# Patient Record
Sex: FEMALE | Race: WHITE
[De-identification: names, ages, dates, MRNs, and addresses within clinical notes are randomized per-mention and may not be internally consistent; named-entity substitution may affect disease eponyms.]

---

## 2017-09-19 ENCOUNTER — HOSPITAL ENCOUNTER (OUTPATIENT)
Dept: HOSPITAL 84 - D.MAMMO | Age: 77
Discharge: HOME | End: 2017-09-19
Attending: FAMILY MEDICINE
Payer: COMMERCIAL

## 2017-09-19 VITALS — BODY MASS INDEX: 29 KG/M2

## 2017-09-19 DIAGNOSIS — Z12.31: Primary | ICD-10-CM

## 2019-09-30 ENCOUNTER — HOSPITAL ENCOUNTER (OUTPATIENT)
Dept: HOSPITAL 84 - D.LABREF | Age: 79
Discharge: HOME | End: 2019-09-30
Attending: ORTHOPAEDIC SURGERY
Payer: COMMERCIAL

## 2019-09-30 VITALS — BODY MASS INDEX: 29 KG/M2

## 2019-09-30 DIAGNOSIS — M17.11: Primary | ICD-10-CM

## 2019-10-23 ENCOUNTER — HOSPITAL ENCOUNTER (OUTPATIENT)
Dept: HOSPITAL 84 - D.OPS | Age: 79
Discharge: HOME | End: 2019-10-23
Attending: ORTHOPAEDIC SURGERY
Payer: COMMERCIAL

## 2019-10-23 VITALS — HEIGHT: 65 IN | BODY MASS INDEX: 29.16 KG/M2 | WEIGHT: 175 LBS

## 2019-10-23 DIAGNOSIS — M16.11: Primary | ICD-10-CM

## 2019-10-23 DIAGNOSIS — Z53.9: ICD-10-CM

## 2019-10-23 LAB
ANION GAP SERPL CALC-SCNC: 12.5 MMOL/L (ref 8–16)
APPEARANCE UR: (no result)
APTT BLD: 30.7 SECONDS (ref 22.8–39.4)
BACTERIA #/AREA URNS HPF: (no result) /HPF
BASOPHILS NFR BLD AUTO: 0.1 % (ref 0–2)
BILIRUB SERPL-MCNC: NEGATIVE MG/DL
BUN SERPL-MCNC: 22 MG/DL (ref 7–18)
CALCIUM SERPL-MCNC: 9.5 MG/DL (ref 8.5–10.1)
CHLORIDE SERPL-SCNC: 105 MMOL/L (ref 98–107)
CO2 SERPL-SCNC: 27.9 MMOL/L (ref 21–32)
COLOR UR: YELLOW
CREAT SERPL-MCNC: 0.9 MG/DL (ref 0.6–1.3)
EOSINOPHIL NFR BLD: 1.1 % (ref 0–7)
ERYTHROCYTE [DISTWIDTH] IN BLOOD BY AUTOMATED COUNT: 13.6 % (ref 11.5–14.5)
GLUCOSE SERPL-MCNC: 74 MG/DL (ref 74–106)
GLUCOSE SERPL-MCNC: NEGATIVE MG/DL
HCT VFR BLD CALC: 47.5 % (ref 36–48)
HGB BLD-MCNC: 16 G/DL (ref 12–16)
HYALINE CASTS #/AREA URNS LPF: (no result) /LPF
IMM GRANULOCYTES NFR BLD: 0.4 % (ref 0–5)
INR PPP: 0.93 (ref 0.85–1.17)
KETONES UR STRIP-MCNC: NEGATIVE MG/DL
LYMPHOCYTES NFR BLD AUTO: 24.8 % (ref 15–50)
MCH RBC QN AUTO: 31.3 PG (ref 26–34)
MCHC RBC AUTO-ENTMCNC: 33.7 G/DL (ref 31–37)
MCV RBC: 92.8 FL (ref 80–100)
MONOCYTES NFR BLD: 7 % (ref 2–11)
MUCOUS THREADS #/AREA URNS LPF: (no result) /LPF
NEUTROPHILS NFR BLD AUTO: 66.6 % (ref 40–80)
NITRITE UR-MCNC: NEGATIVE MG/ML
OSMOLALITY SERPL CALC.SUM OF ELEC: 282 MOSM/KG (ref 275–300)
PH UR STRIP: 5 [PH] (ref 5–6)
PLATELET # BLD: 252 10X3/UL (ref 130–400)
PMV BLD AUTO: 9.9 FL (ref 7.4–10.4)
POTASSIUM SERPL-SCNC: 4.4 MMOL/L (ref 3.5–5.1)
PROT UR-MCNC: (no result) MG/DL
PROTHROMBIN TIME: 12 SECONDS (ref 11.6–15)
RBC # BLD AUTO: 5.12 10X6/UL (ref 4–5.4)
RBC #/AREA URNS HPF: (no result) /HPF (ref 0–5)
SODIUM SERPL-SCNC: 141 MMOL/L (ref 136–145)
SP GR UR STRIP: 1.01 (ref 1–1.02)
SQUAMOUS #/AREA URNS HPF: (no result) /HPF (ref 0–5)
UROBILINOGEN UR-MCNC: NORMAL MG/DL
WBC # BLD AUTO: 10.9 10X3/UL (ref 4.8–10.8)

## 2019-11-04 ENCOUNTER — HOSPITAL ENCOUNTER (INPATIENT)
Dept: HOSPITAL 84 - D.MS | Age: 79
LOS: 4 days | Discharge: HOME HEALTH | DRG: 470 | End: 2019-11-08
Attending: ORTHOPAEDIC SURGERY | Admitting: ORTHOPAEDIC SURGERY
Payer: COMMERCIAL

## 2019-11-04 VITALS — BODY MASS INDEX: 29.16 KG/M2 | WEIGHT: 175 LBS | HEIGHT: 65 IN | BODY MASS INDEX: 29.16 KG/M2

## 2019-11-04 DIAGNOSIS — K21.9: ICD-10-CM

## 2019-11-04 DIAGNOSIS — J44.9: ICD-10-CM

## 2019-11-04 DIAGNOSIS — G47.00: ICD-10-CM

## 2019-11-04 DIAGNOSIS — I10: ICD-10-CM

## 2019-11-04 DIAGNOSIS — M16.11: Primary | ICD-10-CM

## 2019-11-04 DIAGNOSIS — E03.9: ICD-10-CM

## 2019-11-06 VITALS — SYSTOLIC BLOOD PRESSURE: 166 MMHG | DIASTOLIC BLOOD PRESSURE: 88 MMHG

## 2019-11-06 VITALS — DIASTOLIC BLOOD PRESSURE: 70 MMHG | SYSTOLIC BLOOD PRESSURE: 140 MMHG

## 2019-11-06 VITALS — DIASTOLIC BLOOD PRESSURE: 72 MMHG | SYSTOLIC BLOOD PRESSURE: 138 MMHG

## 2019-11-06 VITALS — DIASTOLIC BLOOD PRESSURE: 53 MMHG | SYSTOLIC BLOOD PRESSURE: 117 MMHG

## 2019-11-06 LAB
ANION GAP SERPL CALC-SCNC: 14.8 MMOL/L (ref 8–16)
APPEARANCE UR: (no result)
APTT BLD: 30.7 SECONDS (ref 22.8–39.4)
BACTERIA #/AREA URNS HPF: (no result) /HPF
BASOPHILS NFR BLD AUTO: 0.1 % (ref 0–2)
BILIRUB SERPL-MCNC: NEGATIVE MG/DL
BUN SERPL-MCNC: 19 MG/DL (ref 7–18)
CALCIUM SERPL-MCNC: 9.3 MG/DL (ref 8.5–10.1)
CHLORIDE SERPL-SCNC: 105 MMOL/L (ref 98–107)
CO2 SERPL-SCNC: 26.3 MMOL/L (ref 21–32)
COLOR UR: YELLOW
CREAT SERPL-MCNC: 0.9 MG/DL (ref 0.6–1.3)
EOSINOPHIL NFR BLD: 1.2 % (ref 0–7)
ERYTHROCYTE [DISTWIDTH] IN BLOOD BY AUTOMATED COUNT: 13.6 % (ref 11.5–14.5)
GLUCOSE SERPL-MCNC: 97 MG/DL (ref 74–106)
GLUCOSE SERPL-MCNC: NEGATIVE MG/DL
HCT VFR BLD CALC: 49 % (ref 36–48)
HGB BLD-MCNC: 16.1 G/DL (ref 12–16)
IMM GRANULOCYTES NFR BLD: 0.3 % (ref 0–5)
INR PPP: 0.95 (ref 0.85–1.17)
KETONES UR STRIP-MCNC: NEGATIVE MG/DL
LYMPHOCYTES NFR BLD AUTO: 20.6 % (ref 15–50)
MCH RBC QN AUTO: 30.7 PG (ref 26–34)
MCHC RBC AUTO-ENTMCNC: 32.9 G/DL (ref 31–37)
MCV RBC: 93.5 FL (ref 80–100)
MONOCYTES NFR BLD: 7.1 % (ref 2–11)
MUCOUS THREADS #/AREA URNS LPF: (no result) /LPF
NEUTROPHILS NFR BLD AUTO: 70.7 % (ref 40–80)
NITRITE UR-MCNC: NEGATIVE MG/ML
OSMOLALITY SERPL CALC.SUM OF ELEC: 284 MOSM/KG (ref 275–300)
PH UR STRIP: 5 [PH] (ref 5–6)
PLATELET # BLD: 239 10X3/UL (ref 130–400)
PMV BLD AUTO: 10 FL (ref 7.4–10.4)
POTASSIUM SERPL-SCNC: 4.1 MMOL/L (ref 3.5–5.1)
PROT UR-MCNC: NEGATIVE MG/DL
PROTHROMBIN TIME: 12.2 SECONDS (ref 11.6–15)
RBC # BLD AUTO: 5.24 10X6/UL (ref 4–5.4)
RBC #/AREA URNS HPF: (no result) /HPF (ref 0–5)
SODIUM SERPL-SCNC: 142 MMOL/L (ref 136–145)
SP GR UR STRIP: 1.01 (ref 1–1.02)
SQUAMOUS #/AREA URNS HPF: (no result) /HPF (ref 0–5)
UROBILINOGEN UR-MCNC: NORMAL MG/DL
WBC # BLD AUTO: 9.8 10X3/UL (ref 4.8–10.8)
WBC #/AREA URNS HPF: (no result) /HPF

## 2019-11-06 PROCEDURE — 0SR90J9 REPLACEMENT OF RIGHT HIP JOINT WITH SYNTHETIC SUBSTITUTE, CEMENTED, OPEN APPROACH: ICD-10-PCS | Performed by: ORTHOPAEDIC SURGERY

## 2019-11-06 NOTE — NUR
RECEIVED PT TO ROOM, PT IS AWAKE AND ALERT STATES PAIN IS AT A 5, AFTER
SETTING PT UP ADMINISTERED PRN PAIN MEDICATION. FAMILY AT BEDSIDE, PLUGGED IN
WOUND VAC CONTINUE WITH PLAN OF CARE

## 2019-11-06 NOTE — NUR
PLASMA BLADE SET TO 6/8
BOVIE PAD LEFT THIGH [id number] EXP 09/15/21
 
PREPPED ILIAC CREST TO HANA BOOT WITH ALCHOLO/HIBICLENS THEN DRIED
WITH A TOWEL AND PREPPED WITH CHLORAPREP.  STERILE GOWN AND GLOVES
WORN DURING PREP.
 
TRAFFIC MONITORED IN AND OUT OF ROOM AND KEPT TO A MINIMUM.
 
WOUND IRRIGATED WITH 500 ML NS WITH 10% STERILE BETADINE.

## 2019-11-06 NOTE — NUR
Alert and orented  able to voice needs and wants to staff. sleepy, family at
bedside. FAll precations in place. SCD's TEDS in place. Prevena Pluse placed
to Right hip with dressing CDI. Incentive spirometer at bedside and education
given,as well as to trun cough and deep breath . IV to left hand  with 1/2ns
at 50ml/hr. call light in reach.

## 2019-11-07 VITALS — DIASTOLIC BLOOD PRESSURE: 42 MMHG | SYSTOLIC BLOOD PRESSURE: 103 MMHG

## 2019-11-07 VITALS — DIASTOLIC BLOOD PRESSURE: 60 MMHG | SYSTOLIC BLOOD PRESSURE: 128 MMHG

## 2019-11-07 VITALS — DIASTOLIC BLOOD PRESSURE: 47 MMHG | SYSTOLIC BLOOD PRESSURE: 119 MMHG

## 2019-11-07 VITALS — DIASTOLIC BLOOD PRESSURE: 57 MMHG | SYSTOLIC BLOOD PRESSURE: 125 MMHG

## 2019-11-07 VITALS — DIASTOLIC BLOOD PRESSURE: 60 MMHG | SYSTOLIC BLOOD PRESSURE: 110 MMHG

## 2019-11-07 VITALS — DIASTOLIC BLOOD PRESSURE: 51 MMHG | SYSTOLIC BLOOD PRESSURE: 124 MMHG

## 2019-11-07 VITALS — DIASTOLIC BLOOD PRESSURE: 57 MMHG | SYSTOLIC BLOOD PRESSURE: 118 MMHG

## 2019-11-07 LAB
ANION GAP SERPL CALC-SCNC: 11.6 MMOL/L (ref 8–16)
APPEARANCE UR: CLEAR
BASOPHILS NFR BLD AUTO: 0.1 % (ref 0–2)
BILIRUB SERPL-MCNC: NEGATIVE MG/DL
BUN SERPL-MCNC: 18 MG/DL (ref 7–18)
CALCIUM SERPL-MCNC: 8.1 MG/DL (ref 8.5–10.1)
CHLORIDE SERPL-SCNC: 102 MMOL/L (ref 98–107)
CO2 SERPL-SCNC: 28.6 MMOL/L (ref 21–32)
COLOR UR: YELLOW
CREAT SERPL-MCNC: 1 MG/DL (ref 0.6–1.3)
EOSINOPHIL NFR BLD: 0 % (ref 0–7)
ERYTHROCYTE [DISTWIDTH] IN BLOOD BY AUTOMATED COUNT: 13.5 % (ref 11.5–14.5)
GLUCOSE SERPL-MCNC: 110 MG/DL (ref 74–106)
GLUCOSE SERPL-MCNC: 50 MG/DL
HCT VFR BLD CALC: 39.3 % (ref 36–48)
HGB BLD-MCNC: 12.8 G/DL (ref 12–16)
IMM GRANULOCYTES NFR BLD: 0.2 % (ref 0–5)
KETONES UR STRIP-MCNC: NEGATIVE MG/DL
LYMPHOCYTES NFR BLD AUTO: 9.4 % (ref 15–50)
MAGNESIUM SERPL-MCNC: 1.6 MG/DL (ref 1.8–2.4)
MCH RBC QN AUTO: 30.2 PG (ref 26–34)
MCHC RBC AUTO-ENTMCNC: 32.6 G/DL (ref 31–37)
MCV RBC: 92.7 FL (ref 80–100)
MONOCYTES NFR BLD: 8.9 % (ref 2–11)
NEUTROPHILS NFR BLD AUTO: 81.4 % (ref 40–80)
NITRITE UR-MCNC: NEGATIVE MG/ML
OSMOLALITY SERPL CALC.SUM OF ELEC: 278 MOSM/KG (ref 275–300)
PH UR STRIP: 5 [PH] (ref 5–6)
PHOSPHATE SERPL-MCNC: 3 MG/DL (ref 2.5–4.9)
PLATELET # BLD: 226 10X3/UL (ref 130–400)
PMV BLD AUTO: 9.8 FL (ref 7.4–10.4)
POTASSIUM SERPL-SCNC: 4.2 MMOL/L (ref 3.5–5.1)
PROT UR-MCNC: NEGATIVE MG/DL
RBC # BLD AUTO: 4.24 10X6/UL (ref 4–5.4)
SODIUM SERPL-SCNC: 138 MMOL/L (ref 136–145)
SP GR UR STRIP: 1.01 (ref 1–1.02)
UROBILINOGEN UR-MCNC: NORMAL MG/DL
WBC # BLD AUTO: 16.5 10X3/UL (ref 4.8–10.8)

## 2019-11-07 NOTE — OP
PATIENT NAME:  LURDES GLASGOW                     MEDICAL RECORD: [id number]
:[birth year]                                             LOCATION:     D.2226
                                                         ADMISSION DATE:19
SURGEON:  MUMTAZ WRAY DO         
 
 
DATE OF OPERATION:  2019
 
PROCEDURE PERFORMED:  Right total hip arthroplasty.
 
PREOPERATIVE DIAGNOSIS:  Right hip osteoarthritis.
 
POSTOPERATIVE DIAGNOSIS:  Right hip osteoarthritis.
 
INDICATIONS:  Ms. Glasgow is a 79-year-old female who has tried all manners of
nonoperative treatment for her right hip osteoarthritis.  She got to the point
where she was tired of dealing with the pain and wanted something done
surgically as it was affecting her activities of living.  She is aware of the
risks of infection, bleeding, damage to nerves and vessels, need for further
surgery, fracture, leg-length discrepancy, blood clots, and even death and she
signed the consent.
 
SURGEON:  Mumtaz Wray DO
 
ASSISTANT:  Paul Clements, certified surgical first assist.
 
DESCRIPTION OF THE PROCEDURE:  The patient received a block in the preoperative
area by anesthesia and he was given 2 grams of Ancef and 80 mg of gentamicin. 
She was then taken to the operative suite, laid in supine position, sedated and
intubated.  The patient was then moved over to [place] table and positioned.
 
The right hip was prepped and draped in sterile fashion.  Timeout was performed,
everyone was in agreement of the correct side, site, patient and procedure.  The
patient received 1 gram of TXA and then the hip was then re-prepped and then the
Ioban cover was put over and then made incision over the tensor fascia suzanna. 
Careful dissection was made down to the fascia suzanna fascia, this was incised and
the muscle belly taken posterior to the fascia anteriorly, opening up the rectus
interval.  Rectus was then opened and the rectus was taken medially and the
fascia suzanna laterally.  The ascending branch of the lateral femoral circumflex
arteries were encountered, tied off, and coagulated with Aquamantys and cut. 
Once that was done, the capsule was exposed with Hohmann's on either side of it
and the capsule was then opened and tagged.  The Hohmann's was then placed on
the inner aspect of the capsule.  Neck cut was then made and then re-cut.  Once
that was done, the acetabulum was then exposed and a Charnley was placed.  The
labrum was removed as well as the Pulvinar.  We began reaming with a 44, first
medializing and then up to a 50, #50 cup was impacted into place, it fit very
well and the poly was placed in and due to her NICKEL ALLERGY we did not do a
dual mobility.  The femur was then exposed.  Cookie cutter and canal finder were
used in the canal and then we broached up to an #8, #8 was trialed, seemed to be
in good position and good length on AP pelvis, no fracture seen.  I then took
the #8 out and it was a little loose.  We did a #9 and #9 was then inserted into
the patient's femur and with a -3 neck ceramic.  The hip was then reduced and
then confirmed to be in good position and good length on AP pelvis.
 
The hip was then irrigated with Betadine solution 500 mL and 17 mL of sterile
10% povidone-iodine and left in the wound for 3 minutes.  This was then
irrigated out with over a liter of normal saline and then the capsule was closed
and then vancomycin and tobramycin powder as well as Francois powder was placed in
 
 
 
OPERATIVE REPORT                               [id number]    LURDES GLASGOW   
 
 
the wound.  The fascia was then closed with #1-Vicryl, first a figure-of-eight
and then a running locking stitch.  Skin was then closed with 2-0 Vicryl in an
inverted interrupted fashion, 4-0 Monocryl ran on the skin.  A Prevena Plus had
been placed on the skin.
 
She was awakened and taken to the recovery in stable condition.
 
Blood loss was approximately 200 mL.
 
Complication none.
 
TRANSINT:[id number] Voice Confirmation ID: [id number] DOCUMENT ID: [id number]
                                           
                                           MUMTAZ WRAY DO         
 
 
 
Electronically Signed by MUMTAZ BUCIO on 19 at 0746
 
 
 
 
 
 
 
 
 
 
 
 
 
 
 
 
 
 
 
 
 
 
 
 
 
 
 
 
 
CC: JESSIKA NASSAR                                                [id number]
DICTATION DATE: 19     :     19      ADM IN  
                                                                              
[facility]                                          
[address] [id number]

## 2019-11-07 NOTE — MORECARE
CASE MANAGEMENT DISCHARGE SUMMARY
 
 
PATIENT: LURDES GLASGOW               UNIT: [id number]
ACCOUNT#: [id number]                       ADM DATE: 19
AGE: 79     : 44  SEX: F            ROOM/BED: D.2226    
AUTHOR: WALE,DOC                             PHYSICIAN:                               
 
REFERRING PHYSICIAN: VANGIE WRAY DO         
DATE OF SERVICE: 19
Discharge Plan
 
 
Patient Name: LURDES GLASGOW
Facility: [facility]:[place]
Encounter #: [id number]
Medical Record #: [id number]
: 1940
Planned Disposition: Home
Anticipated Discharge Date: 19
 
Discharge Date: 
Expected LOS: 2
Initial Reviewer: [id number]
Initial Review Date: 2019
Generated: 19   3:45 pm 
Comments
 
DCP- Discharge Planning
 
Updated by [id number]: Quita Laguerre on 19   1:44 pm CT
Patient Name: LURDES GLASGOW                                     
Admission Status: Elective   
Accout number: [id number]                              
Admission Date: 2019   
: 1940                                                        
Admission Diagnosis:   
Attending: VANGIE WRAY                                                
Current LOS:  1   
  
Anticipated DC Date: 2019   
Planned Disposition: Home   
Primary Insurance: [employer]   
  
  
Discharge Planning Comments: CM met with patient to discuss discharge 
planning/needs. States she lives with her  in a single story home, no 
steps to enter the home. Her daughter, Dori Matias, will be staying with her 
for 2 weeks. She is planning on outpatient PT at [facility], however states Dr. Wray says she may need home health for the first week or two. She requests 
 
that I return to speak with her about it tomorrow since she is having nausea 
today. CM will meet with her again in the morning. CM will continue to follow 
and assist with discharge planning/needs.  
  
  
  
  
  
  
: Quita Laguerre
 DCPIA - Discharge Planning Initial Assessment
 
Updated by [id number]: Quita Laguerre on 19   2:41 pm
*  Is the patient Alert and Oriented?
Yes
*  How many steps to enter\exit or inside your home? 0/0 *  PCP Dr. Velez *  Pharmacy [facility]
on [address]
*  Preadmission Environment
Home with Family
*  ADLs
Independent
*  Equipment
Bedside Commode
Cane
Other
Walker
*  Other Equipment
Brace  Ice pack
 
*  List name and contact numbers for known caregivers / representatives who 
currently or will assist patient after discharge:
Jovani Glasgow - spouse - [phone]
*  Verbal permission to speak to the caregivers and representatives has been 
obtained from the patient.
Yes
*  Community resources currently utilized
None
*  Additional services required to return to the preadmission environment?
Yes
*  Can the patient safely return to the preadmission environment?
Yes
*  Has this patient been hospitalized within the prior 30 days at any 
hospital?
No
 
 
 
 
 
 
Patient Name: LURDES GLASGOW
 
Encounter #: [id number]
Page [id number]
 
 
 
 
 
Electronically Signed by TED ECHEVARRIA on 19 at 1445
 
 
 
 
 
 
**All edits/amendments must be made on the electronic document**
 
DICTATION DATE: 19     : MERYL  19     
RPT#: [id number]                                DC DATE:        
                                               STATUS: ADM IN  
[facility]
 [place] [address]
***END OF REPORT***

## 2019-11-07 NOTE — NUR
PATIENT IS ALERT AND ORENTED ABLE TO VOICE NEEDS AND WANTS TO STAFF.  FAMILY
AT BEDSIDE. IV TO LEFT HAND WITH 1/2NS AT 50 AND ZOFRAN AT 4.7 ML/HR, NO
REDNESS AT SITE.  C/O OF NAUSEA WITH DRINKING. TEMP .1 REPORTED BY CNA
RECHECK WAS 99.2, CALL TO [facility]  NEW ORDER FOR TYLENOL 500MG PO PRN EVERY 6 HR.
FOR TEMP .5 AND STATED HE WOULD REVIEW CHART. PATIENT AND FAMILY
INFORMED. WOUND VACK IN PLACE TO RIGHT HIP WITH DRESSING CDI. FALL PERCATIONS
IN PLACE, INCENTIVE SPIROMETER N REACH, CALL LIGHT  AND WATER  AND ICE  IN
REACH.

## 2019-11-07 NOTE — NUR
PATIENT IN BED WITH IV INTACT. ZOFRAN DRIP INFUSING. PATIENT STILL NOT EATING
OR DRINKING AT THIS TIME. NAUSEATED AT TIMES. FAMILY AT BEDSIDE. PROVENA WOUND
VAC ON AND WORKING. CALL LIGHT WITHIN REACH.

## 2019-11-08 VITALS — DIASTOLIC BLOOD PRESSURE: 43 MMHG | SYSTOLIC BLOOD PRESSURE: 112 MMHG

## 2019-11-08 VITALS — DIASTOLIC BLOOD PRESSURE: 74 MMHG | SYSTOLIC BLOOD PRESSURE: 164 MMHG

## 2019-11-08 VITALS — DIASTOLIC BLOOD PRESSURE: 42 MMHG | SYSTOLIC BLOOD PRESSURE: 121 MMHG

## 2019-11-08 VITALS — SYSTOLIC BLOOD PRESSURE: 121 MMHG | DIASTOLIC BLOOD PRESSURE: 56 MMHG

## 2019-11-08 LAB
ANION GAP SERPL CALC-SCNC: 7.1 MMOL/L (ref 8–16)
BASOPHILS NFR BLD AUTO: 0.1 % (ref 0–2)
BUN SERPL-MCNC: 14 MG/DL (ref 7–18)
CALCIUM SERPL-MCNC: 8.2 MG/DL (ref 8.5–10.1)
CHLORIDE SERPL-SCNC: 102 MMOL/L (ref 98–107)
CO2 SERPL-SCNC: 29.8 MMOL/L (ref 21–32)
CREAT SERPL-MCNC: 0.7 MG/DL (ref 0.6–1.3)
EOSINOPHIL NFR BLD: 0.3 % (ref 0–7)
ERYTHROCYTE [DISTWIDTH] IN BLOOD BY AUTOMATED COUNT: 13.7 % (ref 11.5–14.5)
GLUCOSE SERPL-MCNC: 96 MG/DL (ref 74–106)
HCT VFR BLD CALC: 40.4 % (ref 36–48)
HGB BLD-MCNC: 13.1 G/DL (ref 12–16)
IMM GRANULOCYTES NFR BLD: 0.3 % (ref 0–5)
LYMPHOCYTES NFR BLD AUTO: 11.3 % (ref 15–50)
MAGNESIUM SERPL-MCNC: 1.9 MG/DL (ref 1.8–2.4)
MCH RBC QN AUTO: 30.5 PG (ref 26–34)
MCHC RBC AUTO-ENTMCNC: 32.4 G/DL (ref 31–37)
MCV RBC: 94 FL (ref 80–100)
MONOCYTES NFR BLD: 10.7 % (ref 2–11)
NEUTROPHILS NFR BLD AUTO: 77.3 % (ref 40–80)
OSMOLALITY SERPL CALC.SUM OF ELEC: 270 MOSM/KG (ref 275–300)
PHOSPHATE SERPL-MCNC: 2 MG/DL (ref 2.5–4.9)
PLATELET # BLD: 217 10X3/UL (ref 130–400)
PMV BLD AUTO: 10.5 FL (ref 7.4–10.4)
POTASSIUM SERPL-SCNC: 3.9 MMOL/L (ref 3.5–5.1)
RBC # BLD AUTO: 4.3 10X6/UL (ref 4–5.4)
SODIUM SERPL-SCNC: 135 MMOL/L (ref 136–145)
WBC # BLD AUTO: 11.4 10X3/UL (ref 4.8–10.8)

## 2019-11-08 NOTE — NUR
PATIENT DISCHARGED TO HOME AMBULATORY WITH FAMILY. DISCHARGE INSTRUCTIONS
GIVEN BOTH VERBALLY AND WRITTEN. ALL QUESTIONS ANSWERED. PATIENT AND FAMILY
VERBALIZED UNDERSTANDING OF SAME. PREVENA CHANGED TO HOME VACCUM WITHOUT
DIFFICULTY. IV TO LEFT HAND D/C WITH CATHETER INTACT. ALL BELONGINGS WITH
PATIENT. NEEDED PRESCRIPTIONS GIVEN TO PATIENT.

## 2019-11-08 NOTE — MORECARE
CASE MANAGEMENT DISCHARGE SUMMARY
 
 
PATIENT: LURDES GLASGOW               UNIT: [id number]
ACCOUNT#: [id number]                       ADM DATE: 19
AGE: 79     : 44  SEX: F            ROOM/BED: D.2226    
AUTHOR: TED ECHEVARRIA                             PHYSICIAN:                               
 
REFERRING PHYSICIAN: VANGIE WRAY DO         
DATE OF SERVICE: 19
Discharge Plan
 
 
Patient Name: LURDES GLASGOW
Facility: [facility]:[place]
Encounter #: [id number]
Medical Record #: [id number]
: 1940
Planned Disposition: Home
Anticipated Discharge Date: 19
 
Discharge Date: 2019
Expected LOS: 2
Initial Reviewer: [email or web address]
Initial Review Date: 2019
Generated: 19   6:36 pm 
Comments
 
DCP- Discharge Planning
 
Updated by [id number]: Quita Laguerre on 19   2:23 pm CT
Received discharge orders. I met with her and her family concerning 
outpatient PT vs Home health for PT. Patient and family states that she would 
like to have home health for the first week, then go to outpatient PT at [facility].
 I called [facility] and they do not have availability until Wednesday next 
week (per Annmarie). I called [facility] and spoke with Arlette, they will start 
PT on Monday, clinical faxed along with OP PT order so they can refer her 
when ready. Discharging today with [facility].
DCP- Discharge Planning
 
Updated by [id number]: Quita Laguerre on 19   1:44 pm CT
Patient Name: LURDES GLASGOW                                     
Admission Status: Elective   
Accout number: [id number]                              
Admission Date: 2019   
: 1940                                                        
Admission Diagnosis:   
Attending: VANGIE WRAY                                                
Current LOS:  1   
  
 
Anticipated DC Date: 2019   
Planned Disposition: Home   
Primary Insurance: [employer]   
  
  
Discharge Planning Comments: CM met with patient to discuss discharge 
planning/needs. States she lives with her  in a single story home, no 
steps to enter the home. Her daughter, Dori Matias, will be staying with her 
for 2 weeks. She is planning on outpatient PT at [facility], however states Dr. Wray says she may need home health for the first week or two. She requests 
that I return to speak with her about it tomorrow since she is having nausea 
today. CM will meet with her again in the morning. CM will continue to follow 
and assist with discharge planning/needs.  
  
  
  
  
  
  
: Quita Laguerre
 DCPIA - Discharge Planning Initial Assessment
 
Updated by [id number]: Quita Gallegoelidia on 19   2:41 pm
*  Is the patient Alert and Oriented?
Yes
*  How many steps to enter\exit or inside your home? 0/0 *  PCP Dr. Velez *  Pharmacy [facility]
on [address]
*  Preadmission Environment
Home with Family
*  ADLs
Independent
*  Equipment
Bedside Commode
Cane
Other
Walker
*  Other Equipment
Brace  Ice pack
 
*  List name and contact numbers for known caregivers / representatives who 
currently or will assist patient after discharge:
Jovani Glasgow - [facility] - [phone]
*  Verbal permission to speak to the caregivers and representatives has been 
obtained from the patient.
Yes
*  Community resources currently utilized
None
*  Additional services required to return to the preadmission environment?
Yes
*  Can the patient safely return to the preadmission environment?
Yes
*  Has this patient been hospitalized within the prior 30 days at any 
 
hospital?
No
 
 
 
 
 
Coverage Notice
 
Reviewer: [email or web address] - Quita Gallegoelidia
 
Notice Issued Date-Time: 2019  15:03
Notice Type: Patient Choice Letter
 
Notice Delivered To: Patient
Relationship to Patient: Self
Representative Name: 
 
Delivery Method: HAND - Hand Delivered
Evangelina Days:
Prior Verbal Notification: 
 
Recipient Understood Notice: Yes
Recipient Signature: Yes
Med Rec Note Co-signed by Attending:
 
Coverage Notice Comment:  [facility]
 
Last DP export: 19   2:27 p
Patient Name: LURDES GLASGOW
Encounter #: [id number]
Page [id number]
 
 
 
 
 
Electronically Signed by TED [facility] on 19 at 1736
 
 
 
 
 
 
**All edits/amendments must be made on the electronic document**
 
DICTATION DATE: 19     : MERYL  19     
RPT#: [id number]                                IN DATE:19
                                               STATUS: DIS IN  
[facility]
[address] [place] [address]
***END OF REPORT***

## 2019-11-08 NOTE — MORECARE
CASE MANAGEMENT DISCHARGE SUMMARY
 
 
PATIENT: LURDES GLASGOW               UNIT: [id number]
ACCOUNT#: [id number]                       ADM DATE: 19
AGE: 79     : 44  SEX: F            ROOM/BED: D.2226    
AUTHOR: TED ECHEVARRIA                             PHYSICIAN:                               
 
REFERRING PHYSICIAN: VANGIE WRAY DO         
DATE OF SERVICE: 19
Discharge Plan
 
 
Patient Name: LURDES GLASGOW
Facility: [facility]:[place]
Encounter #: [id number]
Medical Record #: [id number]
: 1940
Planned Disposition: Home
Anticipated Discharge Date: 19
 
Discharge Date: 
Expected LOS: 2
Initial Reviewer: [email or web address]
Initial Review Date: 2019
Generated: 19   4:27 pm 
Comments
 
DCP- Discharge Planning
 
Updated by [id number]: Quita Laguerre on 19   2:23 pm CT
Received discharge orders. I met with her and her family concerning 
outpatient PT vs Home health for PT. Patient and family states that she would 
like to have home health for the first week, then go to outpatient PT at [facility].
 I called [facility] and they do not have availability until Wednesday next 
week (per Annmarie). I called [facility] and spoke with Arlette, they will start 
PT on Monday, clinical faxed along with OP PT order so they can refer her 
when ready. Discharging today with [facility].
DCP- Discharge Planning
 
Updated by [email or web address]: Quita Laguerre on 19   1:44 pm CT
Patient Name: LURDES GLASGOW                                     
Admission Status: Elective   
Accout number: [id number]                              
Admission Date: 2019   
: 1940                                                        
Admission Diagnosis:   
Attending: VANGIE WRAY                                                
Current LOS:  1   
  
 
Anticipated DC Date: 2019   
Planned Disposition: Home   
Primary Insurance: [employer]   
  
  
Discharge Planning Comments: CM met with patient to discuss discharge 
planning/needs. States she lives with her  in a single story home, no 
steps to enter the home. Her daughter, Dori Matias, will be staying with her 
for 2 weeks. She is planning on outpatient PT at [facility], however states Dr. Wray says she may need home health for the first week or two. She requests 
that I return to speak with her about it tomorrow since she is having nausea 
today. CM will meet with her again in the morning. CM will continue to follow 
and assist with discharge planning/needs.  
  
  
  
  
  
  
: Quita Laguerre
 DCPIA - Discharge Planning Initial Assessment
 
Updated by [email or web address]: Quita Laguerre on 19   2:41 pm
*  Is the patient Alert and Oriented?
Yes
*  How many steps to enter\exit or inside your home? 0/0 *  PCP Dr. Velez *  Pharmacy [facility]
on [address]
*  Preadmission Environment
Home with Family
*  ADLs
Independent
*  Equipment
Bedside Commode
Cane
Other
Walker
*  Other Equipment
Brace  Ice pack
 
*  List name and contact numbers for known caregivers / representatives who 
currently or will assist patient after discharge:
Jovani Glasgow - spouse - [phone]
*  Verbal permission to speak to the caregivers and representatives has been 
obtained from the patient.
Yes
*  Community resources currently utilized
None
*  Additional services required to return to the preadmission environment?
Yes
*  Can the patient safely return to the preadmission environment?
Yes
*  Has this patient been hospitalized within the prior 30 days at any 
 
hospital?
No
 
External Providers
External Provider: [facility]
 
Next Contact Date: 
Service Request Date: 
Service Type: 
Resolution: 
 
Reviewer: 
Comments: 
 
 
 
 
Coverage Notice
 
Reviewer: [email or web address] - Quita Laguerre
 
Notice Issued Date-Time: 2019  15:03
Notice Type: Patient Choice Letter
 
Notice Delivered To: Patient
Relationship to Patient: Self
Representative Name: 
 
Delivery Method: HAND - Hand Delivered
Evangelina Days:
Prior Verbal Notification: 
 
Recipient Understood Notice: Yes
Recipient Signature: Yes
Med Rec Note Co-signed by Attending:
 
Coverage Notice Comment:  [facility]
 
Last DP export: 19   2:00 p
Patient Name: LURDES GLASGOW
 
Encounter #: [id number]
Page [id number]
 
 
 
 
 
Electronically Signed by TED ECHEVARRIA on 19 at 1527
 
 
 
 
 
 
**All edits/amendments must be made on the electronic document**
 
DICTATION DATE: 19     : MERYL  19     
RPT#: [id number]                                DC DATE:        
                                               STATUS: ADM IN  
[facility]
[address] [place] [address]
***END OF REPORT***

## 2019-11-08 NOTE — NUR
AWAKE AND ALERT. ORIENTED X3. NO C/O AT THIS TIME. LUNGS ARE CLEAR BILATERALLY
OCCASSIONALLY PRODUCTIVE COUGH NOTED. SKIN IS INTACT WITHOUT REDNESS EXCEPT
INCISION TO RIGHT HIP WHICH IS CLEAN AND DRY WITH PROVENNA IN PLACE. IV TO
LEFT HAND IS PATENT WITHOUT REDNESS AT INSERTION SITE. FAMILY AT BEDSIDE.
DENIES NEEDS. REPORTS NO BM SINCE SURGERY.

## 2019-11-08 NOTE — NUR
ATE ABOUT HALF OF BREAKFAST. UP AMBULATED IN HALLWAY WITH PT. HAD A "HOT
FLASH" AFTER 50 FEET. RESTED A FEW MINUTED AND WENT ANOTHER 30FEET THEN HAD
ANOTHER HOT FLASH. NO NAUSEA WITH THESE. SITTING UP IN CHAIR AT BEDSIDE AT
THIS TIME.

## 2019-11-08 NOTE — MORECARE
CASE MANAGEMENT DISCHARGE SUMMARY
 
 
PATIENT: LURDES GLASGOW               UNIT: [id number]
ACCOUNT#: [id number]                       ADM DATE: 19
AGE: 79     : 44  SEX: F            ROOM/BED: D.2226    
AUTHOR: WALE,DOC                             PHYSICIAN:                               
 
REFERRING PHYSICIAN: VANGIE WRAY DO         
DATE OF SERVICE: 19
Discharge Plan
 
 
Patient Name: LURDES GLASGOW
Facility: [facility]:[place]
Encounter #: [id number]
Medical Record #: [id number]
: 1940
Planned Disposition: Home
Anticipated Discharge Date: 19
 
Discharge Date: 
Expected LOS: 2
Initial Reviewer: [email or web address]
Initial Review Date: 2019
Generated: 19   4:00 pm 
Comments
 
DCP- Discharge Planning
 
Updated by [email or web address]: Quita Laguerre on 19   1:44 pm CT
Patient Name: LURDES GLASGOW                                     
Admission Status: Elective   
Accout number: [id number]                              
Admission Date: 2019   
: 1940                                                        
Admission Diagnosis:   
Attending: VANGIE WRAY                                                
Current LOS:  1   
  
Anticipated DC Date: 2019   
Planned Disposition: Home   
Primary Insurance: [employer]   
  
  
Discharge Planning Comments: CM met with patient to discuss discharge 
planning/needs. States she lives with her  in a single story home, no 
steps to enter the home. Her daughter, Dori Matias, will be staying with her 
for 2 weeks. She is planning on outpatient PT at [facility], however states Dr. Wray says she may need home health for the first week or two. She requests 
 
that I return to speak with her about it tomorrow since she is having nausea 
today. CM will meet with her again in the morning. CM will continue to follow 
and assist with discharge planning/needs.  
  
  
  
  
  
  
: Quita Laguerre
 DCPIA - Discharge Planning Initial Assessment
 
Updated by [id number]: Quita Laguerre on 19   2:41 pm
*  Is the patient Alert and Oriented?
Yes
*  How many steps to enter\exit or inside your home? 0/0 *  PCP Dr. Velez *  Pharmacy [facility]
on [address]
*  Preadmission Environment
Home with Family
*  ADLs
Independent
*  Equipment
Bedside Commode
Cane
Other
Walker
*  Other Equipment
Brace  Ice pack
 
*  List name and contact numbers for known caregivers / representatives who 
currently or will assist patient after discharge:
Jovani Glasgow - spouse - [phone]
*  Verbal permission to speak to the caregivers and representatives has been 
obtained from the patient.
Yes
*  Community resources currently utilized
None
*  Additional services required to return to the preadmission environment?
Yes
*  Can the patient safely return to the preadmission environment?
Yes
*  Has this patient been hospitalized within the prior 30 days at any 
hospital?
No
 
External Providers
External Provider: [employer]
 
Next Contact Date: 
Service Request Date: 
Service Type: 
Resolution: 
 
 
Reviewer: 
Comments: 
 
 
 
 
 
 
Last DP export: 19   1:45 p
Patient Name: LURDES GLASGOW
Encounter #: [id number]
Page [id number]
 
 
 
 
 
Electronically Signed by TED ECHEVARRIA on 19 at 1500
 
 
 
 
 
 
**All edits/amendments must be made on the electronic document**
 
DICTATION DATE: 19 1500     : MERYL  19 1500     
RPT#: [id number]                                DC DATE:        
                                               STATUS: ADM IN  
[facility]
[address] [place] [address]
***END OF REPORT***

## 2023-10-02 NOTE — NUR
LATE ENTRY: ALL CHARTING ORIGINALLY PERFORMED ON PAPER DUE TO
INABILITY TO DO SO IN NORMAL EMR. CHART RESULTS TRANSFERRED TO EMR
UPON RESUMPTION OF ELECTRONIC ACCESS. REQUIRED- Click to run Sepsis Recognition Calculator